# Patient Record
Sex: FEMALE | Race: WHITE | NOT HISPANIC OR LATINO | URBAN - METROPOLITAN AREA
[De-identification: names, ages, dates, MRNs, and addresses within clinical notes are randomized per-mention and may not be internally consistent; named-entity substitution may affect disease eponyms.]

---

## 2019-02-18 ENCOUNTER — TELEPHONE (OUTPATIENT)
Dept: CARDIOLOGY | Facility: CLINIC | Age: 59
End: 2019-02-18

## 2019-02-18 NOTE — TELEPHONE ENCOUNTER
Please advise: patient stated her daughter started having symptoms of brain fog & temporary paralysis and numbness on one side of her body. Stated this has been going on for a year and she is having a lot of dizziness and off-balance as well. She has seen a lot of different doctors but never a cardiologist & is wanting to know if you think she should see a cardiologist or cardiovascular doctor?

## 2019-02-18 NOTE — TELEPHONE ENCOUNTER
----- Message from Jairon Gautam sent at 2/18/2019 11:22 AM CST -----  Type:  Patient Call Back    Who Called: pt   Does the patient know what this is regarding?: pt needs medical advice on a personal issue.   Best Call Back Number:  [phone]  Additional Information:  Please call pt and leave a detailed message if there is no answer.

## 2019-02-18 NOTE — TELEPHONE ENCOUNTER
Advised patient that Dr. Mahmood stated it may be a good idea to see a cardiologist & to check with the neurologist first. Patient verbalized understanding.

## 2020-11-27 ENCOUNTER — TELEPHONE (OUTPATIENT)
Dept: CARDIOLOGY | Facility: CLINIC | Age: 60
End: 2020-11-27

## 2020-11-27 NOTE — TELEPHONE ENCOUNTER
----- Message from Ebonie Goldberg sent at 11/27/2020  2:20 PM CST -----  Pt called to schedule np appointment pt has medicaid I told pt she would need a referral to see the office pt stated she was told by the insurance company she would not need one please reach out to pt at  [phone] or [phone]

## 2020-11-27 NOTE — TELEPHONE ENCOUNTER
Spoke to pt over the phone, inform her that Dr Mahmood is not taking new medicaid patients . Pt verbalized understanding